# Patient Record
Sex: MALE | Race: WHITE
[De-identification: names, ages, dates, MRNs, and addresses within clinical notes are randomized per-mention and may not be internally consistent; named-entity substitution may affect disease eponyms.]

---

## 2012-10-09 VITALS — DIASTOLIC BLOOD PRESSURE: 83 MMHG | SYSTOLIC BLOOD PRESSURE: 130 MMHG

## 2017-06-05 ENCOUNTER — HOSPITAL ENCOUNTER (EMERGENCY)
Dept: HOSPITAL 33 - ED | Age: 57
Discharge: LEFT BEFORE DISCHARGE | End: 2017-06-05
Payer: SELF-PAY

## 2017-06-05 DIAGNOSIS — Z53.9: Primary | ICD-10-CM

## 2020-03-22 ENCOUNTER — HOSPITAL ENCOUNTER (EMERGENCY)
Dept: HOSPITAL 33 - ED | Age: 60
Discharge: HOME | End: 2020-03-22
Payer: COMMERCIAL

## 2020-03-22 VITALS — SYSTOLIC BLOOD PRESSURE: 166 MMHG | HEART RATE: 88 BPM | DIASTOLIC BLOOD PRESSURE: 88 MMHG | OXYGEN SATURATION: 99 %

## 2020-03-22 DIAGNOSIS — K21.9: ICD-10-CM

## 2020-03-22 DIAGNOSIS — M19.90: ICD-10-CM

## 2020-03-22 DIAGNOSIS — I10: ICD-10-CM

## 2020-03-22 DIAGNOSIS — S16.1XXA: Primary | ICD-10-CM

## 2020-03-22 DIAGNOSIS — E03.9: ICD-10-CM

## 2020-03-22 PROCEDURE — 96372 THER/PROPH/DIAG INJ SC/IM: CPT

## 2020-03-22 PROCEDURE — 99283 EMERGENCY DEPT VISIT LOW MDM: CPT

## 2020-03-22 NOTE — ERPHSYRPT
- History of Present Illness


Time Seen by Provider: 03/22/20 14:15


Source: patient


Exam Limitations: no limitations


Physician History: 





Pain in both side of neck radiation to left side first and now it is toward 

right side of upper extremity


Timing/Duration: yesterday


Severity: mild


Modifying Factors: Improves With: cold therapy


Associated Symptoms: denies symptoms


Allergies/Adverse Reactions: 








No Known Drug Allergies Allergy (Verified 06/15/16 04:00)


 





Home Medications: 








Hydrocodone/APAP 10/325 mg*** [Norco 10/325 MG Tablet***] 1 tab PO Q6HPRN PRN 06

/15/16 [History]


Loratadine 10 mg*** [Claritin 10 mg***] 10 mg PO DAILY 06/15/16 [History]


Omeprazole 20 MG [Prilosec 20 mg] 20 mg PO DAILY 06/15/16 [History]


lisinopriL [Lisinopril] 10 mg PO DAILY 06/15/16 [History]


Fluticasone Propionate*** [Flonase NASAL***] 16 gm NS .PRN 06/27/16 [History]


Omeprazole 40 mg PO DAILY 06/27/16 [History]





Hx Tetanus, Diphtheria Vaccination/Date Given: Yes


Hx Influenza Vaccination/Date Given: Yes


Hx Pneumococcal Vaccination/Date Given: No





Travel Risk





- International Travel


Have you traveled outside of the country in past 3 weeks: No


Have you or anyone close to you been diagnosed with or: No


Do your reside in a community with a known COVID-19 case?: No





- Coronavirus Screening


Has patient experienced Coronavirus symptoms: No





- Review of Systems


Constitutional: No Fever, No Chills


Eyes: No Symptoms


Ears, Nose, & Throat: No Symptoms


Respiratory: No Cough, No Dyspnea


Cardiac: No Chest Pain, No Edema, No Syncope


Abdominal/Gastrointestinal: No Abdominal Pain, No Nausea, No Vomiting, No 

Diarrhea


Genitourinary Symptoms: No Dysuria


Musculoskeletal: Neck Pain, No Back Pain


Skin: No Rash


Neurological: No Dizziness, No Focal Weakness, No Sensory Changes


Psychological: No Symptoms


Endocrine: No Symptoms


All Other Systems: Reviewed and Negative





- Past Medical History


Pertinent Past Medical History: Yes


Neurological History: No Pertinent History


ENT History: No Pertinent History


Cardiac History: Hypertension


Respiratory History: No Pertinent History


Endocrine Medical History: Hypothyroidism


Musculoskeletal History: Osteoarthritis


GI Medical History: GERD


 History: Other


Psycho-Social History: Anxiety


Male Reproductive Disorders: No Pertinent History


Other Medical History: Back pain





- Past Surgical History


Past Surgical History: No


Neuro Surgical History: No Pertinent History


Cardiac: No Pertinent History


Respiratory: No Pertinent History


Gastrointestinal: No Pertinent History


Genitourinary: No Pertinent History


Musculoskeletal: No Pertinent History


Male Surgical History: No Pertinent History


Other Surgical History: CYST REMVOED FROM HEAD





- Social History


Smoking Status: Never smoker


Exposure to second hand smoke: No


Drug Use: none


Patient Lives Alone: No





- Nursing Vital Signs


Nursing Vital Signs: 





 Initial Vital Signs











Temperature  98 F   03/22/20 14:02


 


Pulse Rate  88   03/22/20 14:02


 


Respiratory Rate  20   03/22/20 14:02


 


Blood Pressure  166/88   03/22/20 14:02


 


O2 Sat by Pulse Oximetry  99   03/22/20 14:02








 Pain Scale











Pain Intensity                 8

















- Physical Exam


General Appearance: no apparent distress, alert


Eye Exam: PERRL/EOMI, eyes nml inspection


Ears, Nose, Throat Exam: normal ENT inspection, TMs normal, pharynx normal, 

moist mucous membranes


Neck Exam: normal inspection, non-tender, supple, full range of motion


Respiratory Exam: normal breath sounds, lungs clear, No respiratory distress


Cardiovascular Exam: regular rate/rhythm, normal heart sounds, normal 

peripheral pulses


Gastrointestinal/Abdomen Exam: soft, normal bowel sounds, No tenderness, No mass


Back Exam: normal inspection, normal range of motion, muscle spasm (neck area), 

No CVA tenderness, No vertebral tenderness


Extremity Exam: normal inspection, normal range of motion, pelvis stable


Neurologic Exam: alert, oriented x 3, cooperative, normal mood/affect, nml 

cerebellar function, nml station & gait, sensation nml, No motor deficits


Skin Exam: normal color, warm, dry, No rash


Lymphatic Exam: No adenopathy





- Course


Nursing assessment & vital signs reviewed: Yes


Ordered Tests: 





Medication Summary














Discontinued Medications














Generic Name Dose Route Start Last Admin





  Trade Name Freq  PRN Reason Stop Dose Admin


 


Ketorolac Tromethamine  60 mg  03/22/20 14:12  





  Toradol 30 Mg Injection***  IM  03/22/20 14:13  





  STAT ONE   





     





     





     





     


 


Orphenadrine Citrate  60 mg  03/22/20 14:12  





  Norflex 60 Mg/2 Ml***  IM  03/22/20 14:13  





  STAT ONE   





     





     





     





     














- Progress


Progress: improved, pain not gone completely


Counseled pt/family regarding: diagnosis, need for follow-up





- Departure


Departure Disposition: Home


Clinical Impression: 


Neck muscle strain


Qualifiers:


 Encounter type: initial encounter Qualified Code(s): S16.1XXA - Strain of 

muscle, fascia and tendon at neck level, initial encounter





Condition: Stable


Critical Care Time: No


Referrals: 


POLLO FELIPE [Primary Care Provider] - 


Instructions:  Cervical Muscle Strain (DC), Generalized Neck Pain (DC)


Additional Instructions: 


Discharge/Care Plan





VIKAS WALTER was seen on 03/22/20 in the Emergency Room. The patient was 

counseled regarding Diagnosis,Lab results, Imaging studies, need for follow up 

and when to return to the Emergency Room.





Prescriptions given:





Discharge Note





I have spoken with the patient and/or caregivers. I have explained the patient'

s condition, diagnosis and treatment plan based on the information available to 

me at this time. I have answered the patient's and/or caregiver's questions and 

addressed any concerns. The patient and/or caregivers have as good 

understanding of the patient's diagnosis, condition and treatment plan as can 

be expected at this point. The vital signs have been stable. The patient's 

condition is stable and appropriate for discharge from the emergency department.





The patient will pursue further outpatient evaluation with the primary care 

physician or other designated or consulting physician as outlined in the 

discharge instructions. The patient and/or caregivers are agreeable to this 

plan of care and follow-up instructions have been explained in detail. The 

patient and/or caregivers have received these instruction. The patient/and or 

caregivers are aware that any significant change in condition or worsening of 

symptoms should prompt an immediate return to this or the closest emergency 

department or call 911. 








Prescriptions: 


Cyclobenzaprine HCl 10 mg*** [Flexeril 10 MG] 10 mg PO TID #30 tablet


Naproxen 375 mg*** [Naprosyn 375 mg***] 375 mg PO Q8H #30 tablet

## 2020-10-24 ENCOUNTER — HOSPITAL ENCOUNTER (EMERGENCY)
Dept: HOSPITAL 33 - ED | Age: 60
Discharge: HOME | End: 2020-10-24
Payer: COMMERCIAL

## 2020-10-24 VITALS — SYSTOLIC BLOOD PRESSURE: 140 MMHG | DIASTOLIC BLOOD PRESSURE: 95 MMHG

## 2020-10-24 VITALS — HEART RATE: 82 BPM | OXYGEN SATURATION: 97 %

## 2020-10-24 DIAGNOSIS — M54.9: ICD-10-CM

## 2020-10-24 DIAGNOSIS — M54.2: ICD-10-CM

## 2020-10-24 DIAGNOSIS — Z79.899: ICD-10-CM

## 2020-10-24 DIAGNOSIS — M62.838: Primary | ICD-10-CM

## 2020-10-24 PROCEDURE — 96372 THER/PROPH/DIAG INJ SC/IM: CPT

## 2020-10-24 PROCEDURE — 99284 EMERGENCY DEPT VISIT MOD MDM: CPT

## 2020-10-24 NOTE — ERPHSYRPT
- History of Present Illness


Time Seen by Provider: 10/24/20 13:56


Source: patient, family


Exam Limitations: no limitations


Physician History: 





This is a 60-year-old white male who has a history of osteoarthritis and 

recurrent back and neck pain.  A few days ago, patient states that he nearly 

fell but did not fall and hit his head or neck but it [place] his neck.  The pain

has progressively worsened.  Pain was in the left neck area and has now moved 

and includes the right posterior lateral neck.  Heat has helped but is only 

temporary.  Earlier this morning the patient took tramadol and ibuprofen and it 

did not seem to help him.  He also too a 10 mg Flexeril earlier today.  Patient 

denies chest pain, he denies shortness of air and he denies abdominal pain.  He 

does not have a headache and does not have any visual changes.


Timing/Duration: day(s) (Few)


Method of Injury: near fall (A few days ago)


Quality: aching


Severity of Pain-Max: moderate


Severity of Pain-Current: moderate


Modifying Factors: Improves With: movement


Associated Symptoms: other (Neck pain), No dizziness, No numbness in legs/feet


Previous symptoms: same symptoms as today


Allergies/Adverse Reactions: 








No Known Drug Allergies Allergy (Verified 10/24/20 14:05)


   





Home Medications: 








Loratadine 10 mg*** [Claritin 10 mg***] 10 mg PO DAILY 06/15/16 [History]


Omeprazole 20 MG [Prilosec 20 mg] 20 mg PO DAILY 06/15/16 [History]


lisinopriL [Lisinopril] 10 mg PO DAILY 06/15/16 [History]


Fluticasone Propionate*** [Flonase NASAL***] 16 gm NS .PRN 06/27/16 [History]





Hx Tetanus, Diphtheria Vaccination/Date Given: Yes


Hx Influenza Vaccination/Date Given: Yes


Hx Pneumococcal Vaccination/Date Given: No





Travel Risk





- International Travel


Have you traveled outside of the country in past 3 weeks: No





- Coronavirus Screening


Are you exhibiting any of the following symptoms?: No


Close contact with a COVID-19 positive Pt in past 14-21 Days: No





- Review of Systems


Constitutional: No Symptoms


Eyes: No Symptoms


Ears, Nose, & Throat: No Symptoms


Respiratory: No Symptoms


Cardiac: No Symptoms


Abdominal/Gastrointestinal: No Symptoms


Genitourinary Symptoms: No Symptoms


Musculoskeletal: Neck Pain


Skin: No Symptoms


Neurological: No Symptoms


Psychological: No Symptoms


Endocrine: No Symptoms


Hematologic/Lymphatic: No Symptoms


Immunological/Allergic: No Symptoms


All Other Systems: Reviewed and Negative





- Past Medical History


Pertinent Past Medical History: Yes


Neurological History: No Pertinent History


ENT History: No Pertinent History


Cardiac History: Hypertension


Respiratory History: No Pertinent History


Endocrine Medical History: Hypothyroidism


Musculoskeletal History: Osteoarthritis


GI Medical History: GERD


 History: Other


Psycho-Social History: Anxiety


Male Reproductive Disorders: No Pertinent History


Other Medical History: Back pain





- Past Surgical History


Past Surgical History: No


Neuro Surgical History: No Pertinent History


Cardiac: No Pertinent History


Respiratory: No Pertinent History


Gastrointestinal: No Pertinent History


Genitourinary: No Pertinent History


Musculoskeletal: No Pertinent History


Male Surgical History: No Pertinent History


Other Surgical History: CYST REMVOED FROM HEAD





- Social History


Smoking Status: Never smoker


Exposure to second hand smoke: No


Drug Use: none


Patient Lives Alone: No





- Nursing Vital Signs


Nursing Vital Signs: 


                               Initial Vital Signs











Pulse Rate  99 H  10/24/20 14:09


 


Respiratory Rate  18   10/24/20 14:09


 


Blood Pressure  140/95   10/24/20 14:09


 


O2 Sat by Pulse Oximetry  99   10/24/20 14:09








                                   Pain Scale











Pain Intensity                 9

















- Physical Exam


General Appearance: mild distress, alert, anxiety


Eye Exam: PERRL/EOMI, eyes nml inspection


Ears, Nose, Throat Exam: normal ENT inspection, moist mucous membranes


Neck Exam: normal inspection, limited range of motion, other (Bilateral 

paraspinous muscle tenderness.)


Respiratory Exam: airway intact, No chest tenderness, No respiratory distress


Gastrointestinal Exam: No tenderness


Rectal Exam: No not done


Back Exam: normal inspection, normal range of motion, muscle spasm (Neck 

bilateral paraspinous muscles), No CVA tenderness, No vertebral tenderness


Extremity Exam: normal inspection, normal range of motion, pelvis stable


Neurologic Exam: alert, oriented x 3, cooperative, CNs II-XII nml as tested, 

normal mood/affect, nml cerebellar function, nml station & gait, sensation nml


Skin Exam: normal color, warm, dry


Lymphatic Exam: No adenopathy


**SpO2 Interpretation**: normal


O2 Delivery: Room Air





- Course


Nursing assessment & vital signs reviewed: Yes





- Progress


Progress: improved


Counseled pt/family regarding: diagnosis, need for follow-up





- Departure


Departure Disposition: Home


Clinical Impression: 


 Muscle spasms of neck





Condition: Stable


Critical Care Time: No


Referrals: 


POLLO FELIPE [Primary Care Provider] - 


Additional Instructions: 


Continue your tramadol as prescribed.  Stop your ibuprofen and naproxen.  Stop 

your Flexeril/cyclobenzaprine.  Follow-up with your prescribing physician on 

Monday, October 26 for further management of your pain


Prescriptions: 


Carisoprodol 350 mg** [Soma 350 mg**] 350 mg PO Q8H PRN PRN #6 tablet


 PRN Reason: Muscle Spasms


Prednisone 10 mg*** [Deltasone 10 mg***] 10 mg PO TID #12 tablet

## 2022-03-17 ENCOUNTER — HOSPITAL ENCOUNTER (EMERGENCY)
Dept: HOSPITAL 33 - ED | Age: 62
Discharge: ANOTHER HOSPITAL | End: 2022-03-17
Payer: COMMERCIAL

## 2022-03-17 VITALS — HEART RATE: 84 BPM | SYSTOLIC BLOOD PRESSURE: 131 MMHG | DIASTOLIC BLOOD PRESSURE: 76 MMHG

## 2022-03-17 VITALS — OXYGEN SATURATION: 99 %

## 2022-03-17 DIAGNOSIS — I99.9: Primary | ICD-10-CM

## 2022-03-17 DIAGNOSIS — Z79.891: ICD-10-CM

## 2022-03-17 DIAGNOSIS — Z79.899: ICD-10-CM

## 2022-03-17 DIAGNOSIS — I10: ICD-10-CM

## 2022-03-17 DIAGNOSIS — M25.532: ICD-10-CM

## 2022-03-17 DIAGNOSIS — K21.9: ICD-10-CM

## 2022-03-17 DIAGNOSIS — M79.642: ICD-10-CM

## 2022-03-17 LAB
ALBUMIN SERPL-MCNC: 3.9 G/DL (ref 3.5–5)
ALP SERPL-CCNC: 99 U/L (ref 38–126)
ALT SERPL-CCNC: 18 U/L (ref 0–50)
ANION GAP SERPL CALC-SCNC: 12.4 MEQ/L (ref 5–15)
AST SERPL QL: 22 U/L (ref 17–59)
BASOPHILS # BLD AUTO: 0.02 10*3/UL (ref 0–0.4)
BILIRUB BLD-MCNC: 0.6 MG/DL (ref 0.2–1.3)
BUN SERPL-MCNC: 9 MG/DL (ref 9–20)
CALCIUM SPEC-MCNC: 9.1 MG/DL (ref 8.4–10.2)
CHLORIDE SERPL-SCNC: 101 MMOL/L (ref 98–107)
CK SERPL-CCNC: 72 U/L (ref 55–170)
CO2 SERPL-SCNC: 23 MMOL/L (ref 22–30)
CREAT SERPL-MCNC: 0.92 MG/DL (ref 0.66–1.25)
EOSINOPHIL # BLD AUTO: 0.3 10*3/UL (ref 0–0.5)
GFR SERPLBLD BASED ON 1.73 SQ M-ARVRAT: > 60 ML/MIN
GLUCOSE SERPL-MCNC: 146 MG/DL (ref 74–106)
GLUCOSE UR-MCNC: NEGATIVE MG/DL
HCT VFR BLD AUTO: 41.4 % (ref 42–50)
HGB BLD-MCNC: 13.9 GM/DL (ref 12.5–18)
LYMPHOCYTES # SPEC AUTO: 1.71 10*3/UL (ref 1–4.6)
MCH RBC QN AUTO: 29.4 PG (ref 26–32)
MCHC RBC AUTO-ENTMCNC: 33.6 G/DL (ref 32–36)
MONOCYTES # BLD AUTO: 0.65 10*3/UL (ref 0–1.3)
PLATELET # BLD AUTO: 211 K/MM3 (ref 150–450)
POTASSIUM SERPLBLD-SCNC: 3.8 MMOL/L (ref 3.5–5.1)
PROT SERPL-MCNC: 6.7 G/DL (ref 6.3–8.2)
PROT UR STRIP-MCNC: NEGATIVE MG/DL
RBC # BLD AUTO: 4.72 M/MM3 (ref 4.1–5.6)
RBC #/AREA URNS HPF: (no result) /HPF (ref 0–2)
SODIUM SERPL-SCNC: 132 MMOL/L (ref 137–145)
WBC # BLD AUTO: 8.7 K/MM3 (ref 4–10.5)
WBC #/AREA URNS HPF: (no result) /HPF (ref 0–5)

## 2022-03-17 PROCEDURE — 73130 X-RAY EXAM OF HAND: CPT

## 2022-03-17 PROCEDURE — 82550 ASSAY OF CK (CPK): CPT

## 2022-03-17 PROCEDURE — 84484 ASSAY OF TROPONIN QUANT: CPT

## 2022-03-17 PROCEDURE — 93041 RHYTHM ECG TRACING: CPT

## 2022-03-17 PROCEDURE — 94760 N-INVAS EAR/PLS OXIMETRY 1: CPT

## 2022-03-17 PROCEDURE — 99285 EMERGENCY DEPT VISIT HI MDM: CPT

## 2022-03-17 PROCEDURE — 36415 COLL VENOUS BLD VENIPUNCTURE: CPT

## 2022-03-17 PROCEDURE — 96374 THER/PROPH/DIAG INJ IV PUSH: CPT

## 2022-03-17 PROCEDURE — 85025 COMPLETE CBC W/AUTO DIFF WBC: CPT

## 2022-03-17 PROCEDURE — 73206 CT ANGIO UPR EXTRM W/O&W/DYE: CPT

## 2022-03-17 PROCEDURE — 36000 PLACE NEEDLE IN VEIN: CPT

## 2022-03-17 PROCEDURE — 80053 COMPREHEN METABOLIC PANEL: CPT

## 2022-03-17 PROCEDURE — 81001 URINALYSIS AUTO W/SCOPE: CPT

## 2022-03-17 PROCEDURE — 93005 ELECTROCARDIOGRAM TRACING: CPT

## 2022-03-17 NOTE — XRAY
Indication: Pain 3 days.  No known injury.



Comparison: April 3, 2016.



3 view left hand again demonstrates moderate/advanced degenerative changes 1st

metacarpal multangular scaphoid articulation and small spurring head 3rd

metacarpal.  New multangular scaphoid capitate subcortical cysts presumed

degenerative.  No other bony, articular, or soft tissue abnormalities.

## 2022-03-17 NOTE — XRAY
Indication: Hand/wrist pain 3 days.  Cold fingers.  No radial pulse.  Exam

recommended by Dr. Antonio Rodriguez, thoracic surgeon.



Conventional contrast enhanced CTA entire left upper extremity performed using

100 cc Isovue 370 contrast.  Two-dimensional sagittal and coronal reformatted

images obtained.  Additional 3-dimensional reformatted images obtained using a

separate workstation.



Comparison: None



There is suboptimal opacification of the arteries distal to the elbow limiting

this level.  Visualized aortic arch is normal in course and caliber without

aneurysm/dissection.  Normal widely patent branching right brachiocephalic,

left common carotid, and left subclavian arteries.  Normal CTA appearance to

the subclavian, axillary, and brachial arteries.  Visualized radial and ulnar

arteries are suboptimally opacified without focal stricture or obstruction.



No acute fracture, dislocation, or suspicious bony lesions.  Degenerative

changes of the wrist reported on same day hand exam.  No suspicious

solid/cystic soft tissue mass or abnormal fluid collection.  Visualized lungs

are clear.  No pathologic axillary lymphadenopathy.



Impression:

1.  Forearm arteries are limited due to suboptimal contrast opacification.

2.  Remaining CTA left upper extremity is grossly negative.



Comment: Preliminary interpretation made by [facility].  No critical discrepancy.

## 2022-03-17 NOTE — ERPHSYRPT
- History of Present Illness


Time Seen by Provider: 03/17/22 03:15


Source: patient


Exam Limitations: no limitations


Patient Subjective Stated Complaint: pt states he has been having pain in his lt

hand for last several days. denies any injury


Triage Nursing Assessment: pt alert and oreinted, answers questions approp. pt 

ambulatory with steady gait noted. respirations nonlabored. skin warm and dry. 

pt restless in bed. cap refill and radial pulse to lt hand wnl.


Physician History: 


Patient is a 61-year-old male presents to emergency department for evaluation of

left hand pain.  Patient states left hand pain started approximately 3 days ago.

 Hand pain is gotten progressively worse.  Patient states that his pain kept him

from sleeping this evening.  Pain described as a diffuse ache of the left hand. 

No obvious trauma.  Patient states he has a history of arthritis.  Patient 

states he had similar pain at his right hand which was due to arthritis.  

Patient states that the hand pain resolved with a steroid injection.  Symptoms 

are progressive.  Symptoms are moderate in intensity.  Movement palpation to the

volar and dorsal aspect of the left wrist reproduce symptoms.  Patient voices no

complaints or concerns at this time.





Occurred: other (Pain started 3 days ago.)


Quality: constant


Severity of Pain-Max: moderate


Severity of Pain-Current: mild


Extremities Pain Location: hand: left


Modifying Factors: Improves With: movement


Associated Symptoms: none


Allergies/Adverse Reactions: 








No Known Drug Allergies Allergy (Verified 03/17/22 03:11)


   





Home Medications: 








Omeprazole 20 MG [Prilosec 20 mg] 20 mg PO BID 06/15/16 [History]


Levothyroxine Sodium [Euthyrox] 25 mcg PO DAILY 03/17/22 [History]


Tizanidine HCl 4 mg** [Zanaflex 4 MG**] 4 mg PO TID 03/17/22 [History]


Tramadol HCl 50 mg*** [Ultram 50 mg***] 50 mg PO Q8HPRN PRN 03/17/22 [History]





Hx Tetanus, Diphtheria Vaccination/Date Given: Yes


Hx Influenza Vaccination/Date Given: No


Hx Pneumococcal Vaccination/Date Given: No


Immunizations Up to Date: Yes





Travel Risk





- International Travel


Have you traveled outside of the country in past 3 weeks: No





- Coronavirus Screening


Are you exhibiting any of the following symptoms?: No


Close contact with a COVID-19 positive Pt in past 14-21 Days: No





- Vaccine Status


Have you recieved a Covid-19 vaccination: No





- Review of Systems


Constitutional: No Symptoms, No Fever, No Chills


Eyes: No Symptoms


Ears, Nose, & Throat: No Symptoms


Respiratory: No Symptoms, No Cough, No Dyspnea


Cardiac: No Symptoms, No Chest Pain, No Edema, No Syncope


Abdominal/Gastrointestinal: No Symptoms, No Abdominal Pain, No Nausea, No 

Vomiting, No Diarrhea


Genitourinary Symptoms: No Symptoms, No Dysuria


Musculoskeletal: No Symptoms, No Back Pain, No Neck Pain


Skin: No Symptoms, No Rash


Neurological: No Symptoms, No Dizziness, No Focal Weakness, No Sensory Changes


Psychological: No Symptoms


Endocrine: No Symptoms


Hematologic/Lymphatic: No Symptoms


Immunological/Allergic: No Symptoms


All Other Systems: Reviewed and Negative





- Past Medical History


Pertinent Past Medical History: Yes


Neurological History: No Pertinent History


ENT History: No Pertinent History


Cardiac History: Hypertension


Respiratory History: No Pertinent History


Endocrine Medical History: Hypothyroidism


Musculoskeletal History: Osteoarthritis


GI Medical History: GERD


 History: Other


Psycho-Social History: Anxiety


Male Reproductive Disorders: No Pertinent History


Other Medical History: Back pain





- Past Surgical History


Past Surgical History: Yes


Neuro Surgical History: No Pertinent History


Cardiac: No Pertinent History


Respiratory: No Pertinent History


Gastrointestinal: Appendectomy


Genitourinary: No Pertinent History


Musculoskeletal: No Pertinent History


Male Surgical History: No Pertinent History


Other Surgical History: CYST REMVOED FROM HEAD





- Social History


Smoking Status: Never smoker


Exposure to second hand smoke: Yes


Drug Use: none


Patient Lives Alone: No





- Nursing Vital Signs


Nursing Vital Signs: 


                               Initial Vital Signs











Temperature  98.0 F   03/17/22 03:02


 


Pulse Rate  79   03/17/22 03:02


 


Respiratory Rate  18   03/17/22 03:02


 


Blood Pressure  145/81   03/17/22 03:02


 


O2 Sat by Pulse Oximetry  99   03/17/22 03:02








                                   Pain Scale











Pain Intensity                 4

















- Physical Exam


General Appearance: no apparent distress, alert


Eyes, Ears, Nose, Throat Exam: moist mucous membranes


Neck Exam: non-tender, supple


Cardiovascular/Respiratory Exam: chest non-tender, normal breath sounds, regular

 rate/rhythm, no respiratory distress


Abdominal Exam: non-tender, No guarding


Back Exam: normal inspection, No vertebral tenderness


Shoulder Exam: normal inspection, non-tender, no evidence of injury, normal ROM


Elbow/Forearm Exam: normal inspection, non-tender, no evidence of injury, normal

 ROM


Wrist Exam: limited ROM (Hand and digits appear pale.  They are cool to touch 

versus the contralateral side.  Delayed capillary refill.), soft tissue tend

erness, swelling


Hand Exam: soft tissue tenderness, No normal inspection


Neuro/Tendon Exam: normal sensation, normal motor functions, normal tendon fu

nctions


Mental Status Exam: alert, oriented x 3, cooperative


Skin Exam: normal color, warm, dry


**SpO2 Interpretation**: normal


SpO2: 99


O2 Delivery: Room Air





- Course


Nursing assessment & vital signs reviewed: Yes


EKG Interpreted by Me: RATE ([id number]), Sinus Rhythm, NORMAL AXIS, NORMAL INTERVALS





- Radiology Exams


  ** Hand


X-ray Interpretation: Interpreted by me (No fractures or dislocations.  

Arthritic/degenerative changes observed.  No soft tissue abnormalities.)





- CT Exams


  ** Upper Extremity


CT Interpretation: Tele-radiologist Report (No proximal arterial occlusion or 

significant stenosis.  Evaluation of distal arterial branches is limited by 

caliber and venous contamination without obvious occlusion.)


Ordered Tests: 


                               Active Orders 24 hr











 Category Date Time Status


 


 Cardiac Monitor STAT Care  03/17/22 03:56 Active


 


 EKG-ER Only STAT Care  03/17/22 03:55 Active


 


 IV Insertion STAT Care  03/17/22 03:55 Active


 


 Pulse Oximetry (ED) STAT Care  03/17/22 03:55 Active


 


 CTA UPPER EXTREMITY W/CONTRAST [CT] Stat Exams  03/17/22 04:14 Taken


 


 HAND (MINIMUM 3 VIEWS) Stat Exams  03/17/22 04:18 Taken


 


 CBC W DIFF Stat Lab  03/17/22 04:16 Completed


 


 CK-Creatinine Phosphokinase Stat Lab  03/17/22 04:16 Completed


 


 CMP Stat Lab  03/17/22 04:16 Completed


 


 TROPONIN Q3H Lab  03/17/22 04:16 Completed


 


 TROPONIN Q3H Lab  03/17/22 07:00 Ordered


 


 TROPONIN Q3H Lab  03/17/22 10:00 Ordered


 


 TROPONIN Q3H Lab  03/17/22 13:00 Ordered


 


 TROPONIN Q3H Lab  03/17/22 16:00 Ordered


 


 UA W/RFX UR CULTURE Stat Lab  03/17/22 05:18 Completed








Medication Summary














Discontinued Medications














Generic Name Dose Route Start Last Admin





  Trade Name Freq  PRN Reason Stop Dose Admin


 


Morphine Sulfate  4 mg  03/17/22 04:11  03/17/22 04:16





  Morphine Sulfate 4 Mg/Ml Injection  IV  03/17/22 04:12  4 mg





  STAT ONE   Administration


 


Morphine Sulfate  Confirm  03/17/22 04:14 





  Morphine Sulfate 4 Mg/Ml Injection  Administered  03/17/22 04:15 





  Dose  





  4 mg  





  .ROUTE  





  .STK-MED ONE  











Lab/Rad Data: 


                           Laboratory Result Diagrams





                                 03/17/22 04:16 





                                 03/17/22 04:16 





                               Laboratory Results











  03/17/22 03/17/22 03/17/22 Range/Units





  05:18 04:16 04:16 


 


WBC     (4.0-10.5)  K/mm3


 


RBC     (4.1-5.6)  M/mm3


 


Hgb     (12.5-18.0)  gm/dl


 


Hct     (42-50)  %


 


MCV     ()  fl


 


MCH     (26-32)  pg


 


MCHC     (32-36)  g/dl


 


RDW     (11.5-14.0)  %


 


Plt Count     (150-450)  K/mm3


 


MPV     (7.5-11.0)  fl


 


Gran %     (36.0-66.0)  %


 


Eos # (Auto)     (0-0.5)  


 


Absolute Lymphs (auto)     (1.0-4.6)  


 


Absolute Monos (auto)     (0.0-1.3)  


 


Lymphocytes %     (24.0-44.0)  %


 


Monocytes %     (0.0-12.0)  %


 


Eosinophils %     (0.00-5.0)  %


 


Basophils %     (0.0-0.4)  %


 


Absolute Granulocytes     (1.4-6.9)  


 


Basophils #     (0-0.4)  


 


Sodium    132 L  (137-145)  mmol/L


 


Potassium    3.8  (3.5-5.1)  mmol/L


 


Chloride    101  ()  mmol/L


 


Carbon Dioxide    23  (22-30)  mmol/L


 


Anion Gap    12.4  (5-15)  MEQ/L


 


BUN    9  (9-20)  mg/dL


 


Creatinine    0.92  (0.66-1.25)  mg/dL


 


Estimated GFR    > 60.0  ML/MIN


 


Glucose    146 H  ()  mg/dL


 


Calcium    9.1  (8.4-10.2)  mg/dL


 


Total Bilirubin    0.60  (0.2-1.3)  mg/dL


 


AST    22  (17-59)  U/L


 


ALT    18  (0-50)  U/L


 


Alkaline Phosphatase    99  ()  U/L


 


Creatine Kinase    72  ()  U/L


 


Troponin I   < 0.012   (0.000-0.034)  ng/mL


 


Serum Total Protein    6.7  (6.3-8.2)  g/dL


 


Albumin    3.9  (3.5-5.0)  g/dL


 


Urine Color  YELLOW    (YELLOW)  


 


Urine Appearance  CLEAR    (CLEAR)  


 


Urine pH  5.0    (5-6)  


 


Ur Specific Gravity  1.027    (1.005-1.025)  


 


Urine Protein  NEGATIVE    (Negative)  


 


Urine Ketones  NEGATIVE    (NEGATIVE)  


 


Urine Blood  NEGATIVE    (0-5)  Dick/ul


 


Urine Nitrite  NEGATIVE    (NEGATIVE)  


 


Urine Bilirubin  NEGATIVE    (NEGATIVE)  


 


Urine Urobilinogen  NEGATIVE    (0-1)  mg/dL


 


Ur Leukocyte Esterase  NEGATIVE    (NEGATIVE)  


 


Urine WBC (Auto)  NONE    (0-5)  /HPF


 


Urine RBC (Auto)  NONE    (0-2)  /HPF


 


U Epithel Cells (Auto)  NONE    (FEW)  /HPF


 


Urine Bacteria (Auto)  NONE SEEN    (NEGATIVE)  /HPF


 


Urine Mucus (Auto)  SLIGHT    (NEGATIVE)  /HPF


 


Urine Culture Reflexed  NO    (NO)  


 


Urine Glucose  NEGATIVE    (NEGATIVE)  mg/dL














  03/17/22 Range/Units





  04:16 


 


WBC  8.7  (4.0-10.5)  K/mm3


 


RBC  4.72  (4.1-5.6)  M/mm3


 


Hgb  13.9  (12.5-18.0)  gm/dl


 


Hct  41.4 L  (42-50)  %


 


MCV  87.7  ()  fl


 


MCH  29.4  (26-32)  pg


 


MCHC  33.6  (32-36)  g/dl


 


RDW  13.2  (11.5-14.0)  %


 


Plt Count  211  (150-450)  K/mm3


 


MPV  10.0  (7.5-11.0)  fl


 


Gran %  69.3 H  (36.0-66.0)  %


 


Eos # (Auto)  0.30  (0-0.5)  


 


Absolute Lymphs (auto)  1.71  (1.0-4.6)  


 


Absolute Monos (auto)  0.65  (0.0-1.3)  


 


Lymphocytes %  19.6 L  (24.0-44.0)  %


 


Monocytes %  7.5  (0.0-12.0)  %


 


Eosinophils %  3.4  (0.00-5.0)  %


 


Basophils %  0.2  (0.0-0.4)  %


 


Absolute Granulocytes  6.03  (1.4-6.9)  


 


Basophils #  0.02  (0-0.4)  


 


Sodium   (137-145)  mmol/L


 


Potassium   (3.5-5.1)  mmol/L


 


Chloride   ()  mmol/L


 


Carbon Dioxide   (22-30)  mmol/L


 


Anion Gap   (5-15)  MEQ/L


 


BUN   (9-20)  mg/dL


 


Creatinine   (0.66-1.25)  mg/dL


 


Estimated GFR   ML/MIN


 


Glucose   ()  mg/dL


 


Calcium   (8.4-10.2)  mg/dL


 


Total Bilirubin   (0.2-1.3)  mg/dL


 


AST   (17-59)  U/L


 


ALT   (0-50)  U/L


 


Alkaline Phosphatase   ()  U/L


 


Creatine Kinase   ()  U/L


 


Troponin I   (0.000-0.034)  ng/mL


 


Serum Total Protein   (6.3-8.2)  g/dL


 


Albumin   (3.5-5.0)  g/dL


 


Urine Color   (YELLOW)  


 


Urine Appearance   (CLEAR)  


 


Urine pH   (5-6)  


 


Ur Specific Gravity   (1.005-1.025)  


 


Urine Protein   (Negative)  


 


Urine Ketones   (NEGATIVE)  


 


Urine Blood   (0-5)  Dick/ul


 


Urine Nitrite   (NEGATIVE)  


 


Urine Bilirubin   (NEGATIVE)  


 


Urine Urobilinogen   (0-1)  mg/dL


 


Ur Leukocyte Esterase   (NEGATIVE)  


 


Urine WBC (Auto)   (0-5)  /HPF


 


Urine RBC (Auto)   (0-2)  /HPF


 


U Epithel Cells (Auto)   (FEW)  /HPF


 


Urine Bacteria (Auto)   (NEGATIVE)  /HPF


 


Urine Mucus (Auto)   (NEGATIVE)  /HPF


 


Urine Culture Reflexed   (NO)  


 


Urine Glucose   (NEGATIVE)  mg/dL














- Progress


Progress: improved


Progress Note: 


Case discussed with Dr. Stone vascular surgeon at [facility] who advises 

a CT angio of the upper extremity to determine there is circulation compromise 

of the upper extremity versus a lesion distal in the hand.  It is unclear 

whether or not this will require hand surgery versus vascular.


Labs pending.  CTA ordered.


03/17/22 04:12


CTA of the upper extremity from the aortic arch to the distal upper extremity 

does not reveal any significant stenosis or occlusion.  Per  If no occlusion 

is found the case would be hand as opposed to vascular.  We spoke to Dr. Bonilla 

hand surgeon at [facility] who agrees that the pathology is likely in the 

realm of the hand versus proximal vasculature.  Dr. Bonilla accepted transfer to 

[facility] ED.  Dr. Bonilla will see/evaluate patient this morning.


03/17/22 05:57


Case discussed with Dr. Hunt emergency physician at [facility] who accepts 

transfer on Dr. Bonilla behalf.  Plan of care discussed with patient.  He agrees to

 transfer to [facility] ED for further evaluation and treatment.





Portions of this note were created with voice recognition technology.  There may

 be grammatical, spelling, punctuation or sound alike errors


03/17/22 06:08





Counseled pt/family regarding: lab results, diagnosis, rad results





- Departure


Departure Disposition: Transfer


Clinical Impression: 


 Circulatory compromise of hand, Wrist pain





Condition: Stable


Critical Care Time: No


Referrals: 


POLLO FELIPE [Primary Care Provider] - Follow up/PCP as directed